# Patient Record
Sex: MALE | URBAN - METROPOLITAN AREA
[De-identification: names, ages, dates, MRNs, and addresses within clinical notes are randomized per-mention and may not be internally consistent; named-entity substitution may affect disease eponyms.]

---

## 2021-10-29 ENCOUNTER — OFFICE VISIT (OUTPATIENT)
Dept: URBAN - METROPOLITAN AREA CLINIC 6 | Facility: CLINIC | Age: 51
End: 2021-10-29
Payer: COMMERCIAL

## 2021-10-29 DIAGNOSIS — H04.551 ACQUIRED STENOSIS OF RIGHT NASOLACRIMAL DUCT: ICD-10-CM

## 2021-10-29 DIAGNOSIS — H18.603 BILATERAL KERATOCONUS: ICD-10-CM

## 2021-10-29 DIAGNOSIS — H25.13 AGE-RELATED NUCLEAR CATARACT, BILATERAL: Primary | ICD-10-CM

## 2021-10-29 PROCEDURE — 92012 INTRM OPH EXAM EST PATIENT: CPT | Performed by: OPHTHALMOLOGY

## 2021-10-29 NOTE — IMPRESSION/PLAN
Impression: Age-related nuclear cataract, bilateral: H25.13. Plan: Congenital Cataract both eyes: Observe for now without intervention. The patient was advised to contact us if any change or worsening of vision.

## 2021-10-29 NOTE — IMPRESSION/PLAN
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 Plan: Keratoconus - stable-  Advised no rubbing. Repeat testing annually and proceed with CXL if there is any progression.

## 2021-10-29 NOTE — IMPRESSION/PLAN
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  Plan: Referral given to see Dr. Bev Napoles for possible DCR

## 2022-11-29 ENCOUNTER — OFFICE VISIT (OUTPATIENT)
Dept: URBAN - METROPOLITAN AREA CLINIC 6 | Facility: CLINIC | Age: 52
End: 2022-11-29
Payer: COMMERCIAL

## 2022-11-29 DIAGNOSIS — H25.13 AGE-RELATED NUCLEAR CATARACT, BILATERAL: ICD-10-CM

## 2022-11-29 DIAGNOSIS — H18.603 BILATERAL KERATOCONUS: ICD-10-CM

## 2022-11-29 DIAGNOSIS — H04.551 ACQUIRED STENOSIS OF RIGHT NASOLACRIMAL DUCT: Primary | ICD-10-CM

## 2022-11-29 PROCEDURE — 92014 COMPRE OPH EXAM EST PT 1/>: CPT | Performed by: OPHTHALMOLOGY

## 2022-11-29 RX ORDER — NEOMYCIN SULFATE, POLYMYXIN B SULFATE AND DEXAMETHASONE 3.5; 10000; 1 MG/ML; [USP'U]/ML; MG/ML
SUSPENSION OPHTHALMIC
Qty: 5 | Refills: 0 | Status: ACTIVE
Start: 2022-11-29

## 2022-11-29 ASSESSMENT — INTRAOCULAR PRESSURE
OD: 6
OS: 6

## 2022-11-29 NOTE — IMPRESSION/PLAN
Impression: Age-related nuclear cataract, bilateral: H25.13. Plan: congenital cataracts. Not VS at this point.  Monitor

## 2022-11-29 NOTE — IMPRESSION/PLAN
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 Plan: mild conjunctivits today. Start maxitrol gtts TID for 10 days. Under care of Dr. Luis Rodriguez.  CCrx with him

## 2022-11-29 NOTE — IMPRESSION/PLAN
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 Plan: Keratoconus - stable-  Advised no rubbing. Attempted pentacam today but unable due to unintentional uncooperation/ non-verbal. Repeat testing annually and proceed with CXL if there is any progression.